# Patient Record
Sex: FEMALE | URBAN - METROPOLITAN AREA
[De-identification: names, ages, dates, MRNs, and addresses within clinical notes are randomized per-mention and may not be internally consistent; named-entity substitution may affect disease eponyms.]

---

## 2021-06-10 ENCOUNTER — TELEPHONE (OUTPATIENT)
Dept: CARDIOLOGY | Facility: CLINIC | Age: 21
End: 2021-06-10

## 2021-06-10 NOTE — TELEPHONE ENCOUNTER
Left detailed message for patient regarding holter results. Per  Jorge test showed no significant findings and to keep tilt table appointment. Advised that we would call after receiving the results with further recommendations.